# Patient Record
Sex: FEMALE | Race: WHITE | URBAN - METROPOLITAN AREA
[De-identification: names, ages, dates, MRNs, and addresses within clinical notes are randomized per-mention and may not be internally consistent; named-entity substitution may affect disease eponyms.]

---

## 2017-01-03 ENCOUNTER — THERAPY VISIT (OUTPATIENT)
Dept: PHYSICAL THERAPY | Facility: CLINIC | Age: 22
End: 2017-01-03
Payer: COMMERCIAL

## 2017-01-03 DIAGNOSIS — M25.551 HIP PAIN, RIGHT: Primary | ICD-10-CM

## 2017-01-03 PROCEDURE — 97110 THERAPEUTIC EXERCISES: CPT | Mod: GP | Performed by: PHYSICAL THERAPIST

## 2017-01-03 PROCEDURE — 97140 MANUAL THERAPY 1/> REGIONS: CPT | Mod: GP | Performed by: PHYSICAL THERAPIST

## 2017-01-10 ENCOUNTER — THERAPY VISIT (OUTPATIENT)
Dept: PHYSICAL THERAPY | Facility: CLINIC | Age: 22
End: 2017-01-10
Payer: COMMERCIAL

## 2017-01-10 DIAGNOSIS — M25.551 HIP PAIN, RIGHT: Primary | ICD-10-CM

## 2017-01-10 PROCEDURE — 97140 MANUAL THERAPY 1/> REGIONS: CPT | Mod: GP | Performed by: PHYSICAL THERAPIST

## 2017-01-10 PROCEDURE — 97110 THERAPEUTIC EXERCISES: CPT | Mod: GP | Performed by: PHYSICAL THERAPIST

## 2017-01-10 NOTE — PROGRESS NOTES
The risks, perceived benefits and potential complications (including but not limited to: dizziness/drowsiness, bleeding, infection, pain, damage to adjacent structures, failure to relieve symptoms) of dry needling were discussed with the patient. Questions were addressed and answered.  The patient elected to proceed. Verbal informed consent was obtained.

## 2017-01-19 ENCOUNTER — THERAPY VISIT (OUTPATIENT)
Dept: PHYSICAL THERAPY | Facility: CLINIC | Age: 22
End: 2017-01-19
Payer: COMMERCIAL

## 2017-01-19 DIAGNOSIS — M25.551 HIP PAIN, RIGHT: Primary | ICD-10-CM

## 2017-01-19 PROCEDURE — 97140 MANUAL THERAPY 1/> REGIONS: CPT | Mod: GP | Performed by: PHYSICAL THERAPIST

## 2017-01-19 PROCEDURE — 97110 THERAPEUTIC EXERCISES: CPT | Mod: GP | Performed by: PHYSICAL THERAPIST

## 2017-01-19 PROCEDURE — 97112 NEUROMUSCULAR REEDUCATION: CPT | Mod: GP | Performed by: PHYSICAL THERAPIST

## 2017-01-26 ENCOUNTER — THERAPY VISIT (OUTPATIENT)
Dept: PHYSICAL THERAPY | Facility: CLINIC | Age: 22
End: 2017-01-26
Payer: COMMERCIAL

## 2017-01-26 DIAGNOSIS — M25.551 HIP PAIN, RIGHT: Primary | ICD-10-CM

## 2017-01-26 PROCEDURE — 97140 MANUAL THERAPY 1/> REGIONS: CPT | Mod: GP | Performed by: PHYSICAL THERAPIST

## 2017-01-26 PROCEDURE — 97110 THERAPEUTIC EXERCISES: CPT | Mod: GP | Performed by: PHYSICAL THERAPIST

## 2017-01-26 PROCEDURE — 97112 NEUROMUSCULAR REEDUCATION: CPT | Mod: GP | Performed by: PHYSICAL THERAPIST

## 2017-01-31 ENCOUNTER — THERAPY VISIT (OUTPATIENT)
Dept: PHYSICAL THERAPY | Facility: CLINIC | Age: 22
End: 2017-01-31
Payer: COMMERCIAL

## 2017-01-31 DIAGNOSIS — M25.551 HIP PAIN, RIGHT: Primary | ICD-10-CM

## 2017-01-31 PROCEDURE — 97110 THERAPEUTIC EXERCISES: CPT | Mod: GP | Performed by: PHYSICAL THERAPIST

## 2017-01-31 PROCEDURE — 97140 MANUAL THERAPY 1/> REGIONS: CPT | Mod: GP | Performed by: PHYSICAL THERAPIST

## 2017-01-31 PROCEDURE — 97112 NEUROMUSCULAR REEDUCATION: CPT | Mod: GP | Performed by: PHYSICAL THERAPIST

## 2017-02-06 ENCOUNTER — THERAPY VISIT (OUTPATIENT)
Dept: PHYSICAL THERAPY | Facility: CLINIC | Age: 22
End: 2017-02-06
Payer: COMMERCIAL

## 2017-02-06 DIAGNOSIS — M25.551 HIP PAIN, RIGHT: Primary | ICD-10-CM

## 2017-02-06 PROCEDURE — 97110 THERAPEUTIC EXERCISES: CPT | Mod: GP | Performed by: PHYSICAL THERAPIST

## 2017-02-06 PROCEDURE — 97112 NEUROMUSCULAR REEDUCATION: CPT | Mod: GP | Performed by: PHYSICAL THERAPIST

## 2017-02-06 ASSESSMENT — ACTIVITIES OF DAILY LIVING (ADL)
HOS_ADL_ITEM_SCORE_TOTAL: 60
LIGHT_TO_MODERATE_WORK: SLIGHT DIFFICULTY
GOING_DOWN_1_FLIGHT_OF_STAIRS: NO DIFFICULTY AT ALL
PUTTING_ON_SOCKS_AND_SHOES: NO DIFFICULTY AT ALL
WALKING_APPROXIMATELY_10_MINUTES: SLIGHT DIFFICULTY
GETTING_INTO_AND_OUT_OF_A_BATHTUB: NO DIFFICULTY AT ALL
GOING_UP_1_FLIGHT_OF_STAIRS: NO DIFFICULTY AT ALL
HOS_ADL_COUNT: 17
ROLLING_OVER_IN_BED: SLIGHT DIFFICULTY
WALKING_INITIALLY: SLIGHT DIFFICULTY
WALKING_DOWN_STEEP_HILLS: NO DIFFICULTY AT ALL
HOS_ADL_SCORE(%): 88.24
GETTING_INTO_AND_OUT_OF_AN_AVERAGE_CAR: SLIGHT DIFFICULTY
HOS_ADL_HIGHEST_POTENTIAL_SCORE: 68
TWISTING/PIVOTING_ON_INVOLVED_LEG: SLIGHT DIFFICULTY
WALKING_UP_STEEP_HILLS: NO DIFFICULTY AT ALL
STEPPING_UP_AND_DOWN_CURBS: NO DIFFICULTY AT ALL
STANDING_FOR_15_MINUTES: NO DIFFICULTY AT ALL
SITTING_FOR_15_MINUTES: NO DIFFICULTY AT ALL
HEAVY_WORK: NO DIFFICULTY AT ALL
RECREATIONAL_ACTIVITIES: SLIGHT DIFFICULTY
DEEP_SQUATTING: NO DIFFICULTY AT ALL
WALKING_15_MINUTES_OR_GREATER: SLIGHT DIFFICULTY

## 2017-02-13 ENCOUNTER — THERAPY VISIT (OUTPATIENT)
Dept: PHYSICAL THERAPY | Facility: CLINIC | Age: 22
End: 2017-02-13
Payer: COMMERCIAL

## 2017-02-13 DIAGNOSIS — M25.551 HIP PAIN, RIGHT: ICD-10-CM

## 2017-02-13 PROCEDURE — 97110 THERAPEUTIC EXERCISES: CPT | Mod: GP | Performed by: PHYSICAL THERAPIST

## 2017-02-13 PROCEDURE — 97140 MANUAL THERAPY 1/> REGIONS: CPT | Mod: GP | Performed by: PHYSICAL THERAPIST

## 2017-02-13 PROCEDURE — 97112 NEUROMUSCULAR REEDUCATION: CPT | Mod: GP | Performed by: PHYSICAL THERAPIST

## 2017-02-27 ENCOUNTER — THERAPY VISIT (OUTPATIENT)
Dept: PHYSICAL THERAPY | Facility: CLINIC | Age: 22
End: 2017-02-27
Payer: COMMERCIAL

## 2017-02-27 DIAGNOSIS — M25.551 HIP PAIN, RIGHT: ICD-10-CM

## 2017-02-27 PROCEDURE — 97140 MANUAL THERAPY 1/> REGIONS: CPT | Mod: GP | Performed by: PHYSICAL THERAPIST

## 2017-02-27 PROCEDURE — 97110 THERAPEUTIC EXERCISES: CPT | Mod: GP | Performed by: PHYSICAL THERAPIST

## 2017-03-13 ENCOUNTER — THERAPY VISIT (OUTPATIENT)
Dept: PHYSICAL THERAPY | Facility: CLINIC | Age: 22
End: 2017-03-13
Payer: COMMERCIAL

## 2017-03-13 DIAGNOSIS — M25.551 HIP PAIN, RIGHT: ICD-10-CM

## 2017-03-13 PROCEDURE — 97112 NEUROMUSCULAR REEDUCATION: CPT | Mod: GP | Performed by: PHYSICAL THERAPIST

## 2017-03-13 PROCEDURE — 97110 THERAPEUTIC EXERCISES: CPT | Mod: GP | Performed by: PHYSICAL THERAPIST

## 2017-03-28 ENCOUNTER — OFFICE VISIT (OUTPATIENT)
Dept: INTERNAL MEDICINE | Facility: CLINIC | Age: 22
End: 2017-03-28

## 2017-03-28 VITALS
WEIGHT: 140.8 LBS | DIASTOLIC BLOOD PRESSURE: 66 MMHG | BODY MASS INDEX: 24.94 KG/M2 | HEART RATE: 90 BPM | SYSTOLIC BLOOD PRESSURE: 103 MMHG

## 2017-03-28 DIAGNOSIS — D22.9 MULTIPLE BENIGN NEVI: ICD-10-CM

## 2017-03-28 DIAGNOSIS — L70.0 ACNE VULGARIS: ICD-10-CM

## 2017-03-28 DIAGNOSIS — L24.5 IRRITANT CONTACT DERMATITIS DUE TO OTHER CHEMICAL PRODUCTS: Primary | ICD-10-CM

## 2017-03-28 RX ORDER — TRIAMCINOLONE ACETONIDE 1 MG/G
CREAM TOPICAL
Qty: 30 G | Refills: 0 | Status: SHIPPED | OUTPATIENT
Start: 2017-03-28

## 2017-03-28 RX ORDER — CLINDAMYCIN AND BENZOYL PEROXIDE 10; 50 MG/G; MG/G
GEL TOPICAL 2 TIMES DAILY
Qty: 30 G | Refills: 1 | Status: SHIPPED | OUTPATIENT
Start: 2017-03-28

## 2017-03-28 ASSESSMENT — ENCOUNTER SYMPTOMS
STIFFNESS: 0
SLEEP DISTURBANCES DUE TO BREATHING: 0
NAIL CHANGES: 0
SORE THROAT: 0
CLAUDICATION: 0
POOR WOUND HEALING: 1
ORTHOPNEA: 0
JOINT SWELLING: 0
HOARSE VOICE: 0
NECK PAIN: 0
LEG SWELLING: 0
TACHYCARDIA: 1
TROUBLE SWALLOWING: 0
LEG PAIN: 0
HYPERTENSION: 0
SINUS CONGESTION: 0
NECK MASS: 0
SYNCOPE: 0
ARTHRALGIAS: 1
SKIN CHANGES: 1
MUSCLE WEAKNESS: 0
PALPITATIONS: 1
LIGHT-HEADEDNESS: 1
MYALGIAS: 0
EXERCISE INTOLERANCE: 0
SINUS PAIN: 0
HYPOTENSION: 0
SMELL DISTURBANCE: 0
TASTE DISTURBANCE: 0
BACK PAIN: 0
MUSCLE CRAMPS: 0

## 2017-03-28 ASSESSMENT — ACTIVITIES OF DAILY LIVING (ADL)
ARE_THERE_CARBON_MONOXIDE_DETECTORS_IN_YOUR_HOME?: N
DO_MEMBERS_OF_YOUR_HOUSEHOLD_WEAR_SEAT_BELTS?: Y
DO_MEMBERS_OF_YOUR_HOUSEHOLD_USE_SUNSCREEN?: Y
ARE_THERE_FIREARMS_IN_YOUR_HOME?: Y
DO_MEMBERS_OF_YOUR_HOUSEHOLD_USE_SAFETY_HELMETS?: Y
ARE_THERE_SMOKE_DETECTORS_IN_YOUR_HOME?: Y

## 2017-03-28 ASSESSMENT — PAIN SCALES - GENERAL: PAINLEVEL: NO PAIN (0)

## 2017-03-28 NOTE — PATIENT INSTRUCTIONS
[facility] Medication Refill Request Information:  * Please contact your pharmacy regarding ANY request for medication refills.  ** [facility] Prescription Fax = [phone]  * Please allow 3 business days for routine medication refills.  * Please allow 5 business days for controlled substance medication refills.     [facility] Test Result notification information:  *You will be notified with in 7-10 days of your appointment day regarding the results of your test.  If you are on MyChart you will be notified as soon as the provider has reviewed the results and signed off on them.      Recognizing Skin Cancer  Doing monthly skin checkups is the best way to find new marks or skin changes. During your skin checkups, be sure to follow the ABCDEs of skin checks. This means checking moles or other growths for Asymmetry, Border, Color, Diameter, and Evolving (changing). Note, too, any new growths, or, if any of your growths bleed, itch, or are painful.  The ABCDEs of skin checks  Check your moles or growths for signs of melanoma using ABCDE:    Asymmetry: the sides of the mole or growth don t match    Border: the edges are ragged, notched, or blurred    Color: the color within the mole or growth varies    Diameter: the mole or growth is larger than 6 mm (size of a pencil eraser)    Evolving: the size, shape, or color of the mole or growth is changing (evolving is not shown below.)       In addition to the ABCDEs, other warning signs of skin cancer include:    A spot or mole that looks different from all other marks on your skin    Changes in how an area feels, such as itching, tenderness, or pain    Changes in the skin's surface, such as oozing, bleeding, or scaliness    A sore that does not heal    New swelling or redness beyond the border of a mole  Who s at risk?  Anyone can get skin cancer. But you are at greater risk if you have:    Fair skin, light-colored hair, or light-colored eyes    Many moles or  abnormal moles on your skin    A history of sunburns from sunlight or tanning beds    A family history of skin cancer    A history of exposure to radiation or chemicals    A weakened immune system  Also, a personal history of skin cancer puts you at risk for recurring skin cancer.  How to check your skin  Do your monthly skin checkups in front of a full-length mirror. Check all parts of your body, including your:    Head (ears, face, neck, and scalp)    Torso (front, back, and sides)    Arms (tops, undersides, upper, and lower armpits)    Hands (palms, backs, and fingers, including under the nails)    Buttocks and genitals    Legs (front, back, and sides)    Feet (tops, soles, toes, including under the nails, and between toes)  If you have a lot of moles, take digital photos of them each month. Make sure to take photos both up close and from a distance. These can help you see if any moles change over time.  When to seek medical treatment  Most skin changes are not cancer. But if you see any changes in your skin, call your doctor right away. Only he or she can diagnose a problem. If you have skin cancer, seeing your doctor can be the first step toward getting the treatment that could save your life.     [id number] [employer]. [address] [place] [address], [place] [address]. All rights reserved. This information is not intended as a substitute for professional medical care. Always follow your healthcare professional's instructions.

## 2017-03-28 NOTE — NURSING NOTE
Chief Complaint   Patient presents with     Derm Problem     Patient here for dryness on hands for 1-2 months.      Peter Dietz CMA at 11:26 AM on 3/28/2017.

## 2017-03-28 NOTE — PROGRESS NOTES
"Marla Conley is a 21 year old female who comes in for    CC: skin problem on finger tips  HPI:  Irritation on the fingers, started out with just 1 finger, now on all of them, spreading onto palms. Present x1 month  Dry, starting to be painful with pressure and hot water. Has been using Aveeno lotion to treat, Dove soap. Using Dial soap to wash body. Softsoap for hand washing--no reaction to any of these products in the past. Uses a \"cheap laundry detergent\" >1 year.   Started swimming recently, but skin issue was present before then. Works at [email or web address], around the fish at times. There is a de- in the water. No known chemical exposure. Had fish as a child and gone swimming in the past, without reaction. No fevers. No new body aches/chills.   New moles--she thinks, maybe new freckles. Hasn't always been the best with sunscreen. No known FMH of skin cancer.  Hx eczema as a child.     Other issues discussed today:     Patient Active Problem List   Diagnosis     Acne     Hip pain, right       Current Outpatient Prescriptions   Medication Sig Dispense Refill     L-Lysine 1000 MG TABS Take 1,000 mg by mouth daily       triamcinolone (KENALOG) 0.1 % cream Apply sparingly to affected area three times daily for 14 days. 30 g 0     clindamycin-benzoyl peroxide (BENZACLIN) gel Apply topically 2 times daily 30 g 1     ibuprofen (ADVIL,MOTRIN) 200 MG tablet Take 200 mg by mouth every 4 hours as needed.           ALLERGIES: Review of patient's allergies indicates no known allergies.    PAST MEDICAL HX:   Past Medical History:   Diagnosis Date     Acne vulgaris      Eczema     As a child       PAST SURGICAL HX: No past surgical history on file.    IMMUNIZATION HX:   Immunization History   Administered Date(s) Administered     DTAP (<7y) 1997, 1997, 02/23/1998, 11/19/1998, 07/23/2002     HIB 1997, 1997, 02/23/1998, 11/19/1998     Hepatitis B 1995, 1995, 1997, 05/18/1998     " Human Papilloma Virus 08/28/2009, 08/02/2013, 01/21/2015     IPV 1995, 1997, 11/19/1998, 07/23/2002     Influenza (IIV3) 11/26/2013     MMR 08/26/1998, 09/22/2001     Mantoux 12/28/2015     Meningococcal (Menactra ) 01/21/2015     TDAP Vaccine (Boostrix) 09/01/2011     Tdap (Adacel,Boostrix) 08/28/2009     Varicella 08/26/1998, 02/19/2007       SOCIAL HX:   Social History     Social History Narrative    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[employer], studying Biology, hoping to go to [employer]     Answers for HPI/ROS submitted by the patient on 3/28/2017   General Symptoms: No  Skin Symptoms: Yes  HENT Symptoms: Yes  EYE SYMPTOMS: No  HEART SYMPTOMS: Yes  LUNG SYMPTOMS: No  INTESTINAL SYMPTOMS: No  URINARY SYMPTOMS: No  GYNECOLOGIC SYMPTOMS: No  BREAST SYMPTOMS: No  SKELETAL SYMPTOMS: Yes  BLOOD SYMPTOMS: No  NERVOUS SYSTEM SYMPTOMS: No  MENTAL HEALTH SYMPTOMS: No  PEDS Symptoms: No  Changes in hair: No  Changes in moles/birth marks: Yes  Itching: Yes  Rashes: Yes  Changes in nails: No  Acne: No  Hair in places you don't want it: No  Change in facial hair: No  Warts: No  Non-healing sores: Yes  Scarring: No  Flaking of skin: No  Color changes of hands/feet in cold : No  Sun sensitivity: No  Skin thickening: No  Ear pain: Yes  Ear discharge: No  Hearing loss: No  Tinnitus: No  Nosebleeds: No  Congestion: No  Sinus pain: No  Trouble swallowing: No   Voice hoarseness: No  Mouth sores: No  Sore throat: No  Tooth pain: Yes  Gum tenderness: No  Bleeding gums: No  Change in taste: No  Change in sense of smell: No  Dry mouth: No  Hearing aid used: No  Neck lump: No  Chest pain or pressure: No  Fast or irregular heartbeat: Yes--attributes to caffeine, stress, insufficient sleep  Pain in legs with walking: No  Swelling in feet or ankles: No  Trouble breathing while lying down: No  Fingers or Toes appear blue: No  High blood pressure: No  Low blood pressure: No  Fainting: No  Murmurs: No  Chest pain on exertion: No  Chest  pain at rest: No  Cramping pain in leg during exercise: No  Pacemaker: No  Varicose veins: No  Edema or swelling: No  Fast heart beat: Yes  Wake up at night with shortness of breath: No  Heart flutters: Yes  Light-headedness: Yes  Exercise intolerance: No  Back pain: No  Muscle aches: No  Neck pain: No  Swollen joints: No  Joint pain: Yes  Bone pain: No  Muscle cramps: No  Muscle weakness: No  Joint stiffness: No  Bone fracture: No    OBJECTIVE:  /66 (BP Location: Left arm, Patient Position: Chair, Cuff Size: Adult Regular)  Pulse 90  Wt 63.9 kg (140 lb 12.8 oz)  Breastfeeding? No  BMI 24.94 kg/m2   Wt Readings from Last 1 Encounters:   03/28/17 63.9 kg (140 lb 12.8 oz) (71 %)*     * Growth percentiles are based on [facility] 2-20 Years data.     Constitutional: no distress, comfortable, pleasant, well-groomed  Cardiovascular: regular rate and rhythm, normal S1 and S2, no murmurs, rubs or gallops, radial pulses full and symmetric, cap refill <2 sec  Respiratory: clear to auscultation with good air movement bilaterally, no wheezes or crackles, non-labored  Skin: multiple scattered closed comedones on face, few freckles without any concerning nevi, no jaundice, temp normal, skin n fingertips is erythematous, dry, and peeling  Psychological: appropriate mood, demonstrates intact judgment and logical thought process      ASSESSMENT/PLAN:    1. 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 Recommended Kenalog cream TID for 14 days, regular moisturizer (non-scented, hypoallergenic), and wearing Nitrile gloves while at work.  - triamcinolone (KENALOG) 0.1 % cream; Apply sparingly to affected area three times daily for 14 days.  Dispense: 30 g; Refill: 0    2. Acne vulgaris  3. Multiple benign nevi  Refill provided for Benzaclin gel. Reviewed ABCDE's of skin cancer; no concerning nevi today. Discussed pt can  schedule with dermatology for full skin scan, and f/u acne if needed.  - DERMATOLOGY REFERRAL  - clindamycin-benzoyl peroxide (BENZACLIN) gel; Apply topically 2 times daily  Dispense: 30 g; Refill: 1    FOLLOW UP: If not improving or if worsening    ANABELLA Cruz CNP

## 2017-10-12 ENCOUNTER — OFFICE VISIT (OUTPATIENT)
Dept: DERMATOLOGY | Facility: CLINIC | Age: 22
End: 2017-10-12

## 2017-10-12 DIAGNOSIS — D22.9 MULTIPLE BENIGN NEVI: Primary | ICD-10-CM

## 2017-10-12 DIAGNOSIS — L70.0 ACNE VULGARIS: ICD-10-CM

## 2017-10-12 ASSESSMENT — PAIN SCALES - GENERAL: PAINLEVEL: NO PAIN (0)

## 2017-10-12 NOTE — MR AVS SNAPSHOT
After Visit Summary   10/12/2017    Marla Conley    MRN: [id number]           Patient Information     Date Of Birth          1995        Visit Information        Provider Department      10/12/2017 4:15 PM GARRETT Poe MD [facility] Dermatology        Today's Diagnoses     Multiple benign nevi    -  1    Acne vulgaris          Care Instructions    The ABCDEs of Melanoma    Skin cancer can develop anywhere on the skin. Ask someone for help when checking your skin, especially in hard to see places. If you notice a mole different from others, or that changes, enlarges, itches, or bleeds (even if it is small), you should see a dermatologist.        Start over-the-counter benzoyl peroxide 10% wash on the shoulders, upper back and face.  If 10% is too irritating you can use the 5%. ([employer]Clear makes this product. It is available here at the pharmacy or at target). This medication can bleach your towels and clothing.     It is found in a purple tube in the acne aisle.                     Follow-ups after your visit        Follow-up notes from your care team     Return if symptoms worsen or fail to improve.      Who to contact     Please call your clinic at [phone] to:    Ask questions about your health    Make or cancel appointments    Discuss your medicines    Learn about your test results    Speak to your doctor   If you have compliments or concerns about an experience at your clinic, or if you wish to file a complaint, please contact [facility] Patient Relations at [phone] or email us at [email or web address]         Additional Information About Your Visit        MyChart Information     [employer] gives you secure access to your electronic health record. If you see a primary care provider, you can also send messages to your care team and make appointments. If you have questions, please call your primary care clinic.  If you do not have a primary  care provider, please call [phone] and they will assist you.      [employer] is an electronic gateway that provides easy, online access to your medical records. With [employer], you can request a clinic appointment, read your test results, renew a prescription or communicate with your care team.     To access your existing account, please contact your [facility] or call [phone] for assistance.        Care EveryWhere ID     This is your Care EveryWhere ID. This could be used by other organizations to access your [place] medical records  [id number]         Blood Pressure from Last 3 Encounters:   03/28/17 103/66   01/21/15 114/66   11/26/13 119/75    Weight from Last 3 Encounters:   03/28/17 63.9 kg (140 lb 12.8 oz) (71 %)*   12/08/16 63.5 kg (140 lb) (71 %)*   07/27/16 63.5 kg (140 lb) (72 %)*     * Growth percentiles are based on [facility] 2-20 Years data.              Today, you had the following     No orders found for display       Primary Care Provider    Ray Amezquita MD       [place] [id number]        Equal Access to Services     [facility]: Hadii aad ku hadasho Soomaali, waaxda luqadaha, qaybta kaalmada ademariiayada, amando whittaker . So [facility] [phone].    ATENCIÓN: Si habla español, tiene a jordan disposición servicios gratuitos de asistencia lingüística. Llame al [phone].    We comply with applicable federal civil rights laws and [place] laws. We do not discriminate on the basis of race, color, national origin, age, disability, sex, sexual orientation, or gender identity.            Thank you!     Thank you for choosing [facility]  for your care. Our goal is always to provide you with excellent care. Hearing back from our patients is one way we can continue to improve our services. Please take a few minutes to complete the written survey that you may receive in the mail after your visit with us. Thank you!             Your Updated  Medication List - Protect others around you: Learn how to safely use, store and throw away your medicines at www.disposemymeds.org.          This list is accurate as of: 10/12/17  4:27 PM.  Always use your most recent med list.                   Brand Name Dispense Instructions for use Diagnosis    clindamycin-benzoyl peroxide gel    BENZACLIN    30 g    Apply topically 2 times daily    Acne vulgaris       ibuprofen 200 MG tablet    ADVIL/MOTRIN     Take 200 mg by mouth every 4 hours as needed.        L-Lysine 1000 MG Tabs      Take 1,000 mg by mouth daily        triamcinolone 0.1 % cream    KENALOG    30 g    Apply sparingly to affected area three times daily for 14 days.    Irritant contact dermatitis due to other chemical products

## 2017-10-12 NOTE — NURSING NOTE
"Dermatology Rooming Note    Marla Conley's goals for this visit include:   Chief Complaint   Patient presents with     Skin Check     Marla is here today for a skin check. Marla notes\" I dont have many concerns\"     Tamra Johnson, RMDELONTE    "

## 2017-10-12 NOTE — LETTER
"10/12/2017       RE: Marla Conley  [address] [address]     Dear Colleague,    Thank you for referring your patient, Marla Conley, to the [facility] DERMATOLOGY at [facility]. Please see a copy of my visit note below.    [facility] Dermatology Note      Dermatology Problem List:  1.Benign melanocytic nevi  2. Acne vulgaris    Encounter Date: Oct 12, 2017    CC:   Chief Complaint   Patient presents with     Skin Check     Marla is here today for a skin check. Marla notes\" I dont have many concerns\"         History of Present Illness:  Ms. Marla Conley is a 22 year old female who presents for evaluation of a mole on her left shoulder that has gotten a little bigger. She also has a freckle on her right hand that has gotten somewhat larger. She denies any pain, bleeding or itching associated with these spots. No significant changes recently. No other lesions of concern.    For her acne she uses a [employer] acne cleanser which seems to work well for her face. She is not interested in prescription treatment at this time.     Past Medical History:   Patient Active Problem List   Diagnosis     Acne     Hip pain, right     Past Medical History:   Diagnosis Date     Acne vulgaris      Eczema     As a child     History reviewed. No pertinent surgical history.    Social History:  The patient is a student at [employer]. The patient denies use of tanning beds.    Family History:  There is no family history of melanoma.    Medications:  Current Outpatient Prescriptions   Medication Sig Dispense Refill     L-Lysine 1000 MG TABS Take 1,000 mg by mouth daily       triamcinolone (KENALOG) 0.1 % cream Apply sparingly to affected area three times daily for 14 days. 30 g 0     clindamycin-benzoyl peroxide (BENZACLIN) gel Apply topically 2 times daily 30 g 1     ibuprofen (ADVIL,MOTRIN) 200 MG tablet Take 200 mg by mouth " every 4 hours as needed.          No Known Allergies      Review of Systems:  -As per HPI  -Constitutional: The patient denies fatigue, fevers, chills, unintended weight loss, and night sweats.  -HEENT: Patient denies nonhealing oral sores.  -Skin: As above in HPI. No additional skin concerns.    Physical exam:  Vitals: There were no vitals taken for this visit.  GEN: This is a well developed, well-nourished female in no acute distress, in a pleasant mood.    SKIN: Full skin, which includes the head/face, both arms, chest, back, abdomen,both legs, genitalia and/or groin buttocks, digits and/or nails, was examined.  -There are superifical acneiform papules with intermixed open and closed comedones on the shoulders, upper back. Bilateral cheeks and forehead with few inflammatory papules, scattered depressed scars over the cheeks.   Multiple regular brown pigmented macules and papules are identified on the left upper shoulder, right dorsal hand as well as the bilateral upper arms, left neck, chest, back  - well defined light brown patches on the abdomen and central back c/w cafe au lait macules.   -No other lesions of concern on areas examined.     Impression/Plan:  1. Acne vulgaris    Discussed etiology and various treatments. Patient prefers to continue Deepthi acne products    Recommended benzoyl peroxide face wash for shoulders and back    2. Multiple clinically benign nevi. No concerning lesions on exam today.     ABCDs of melanoma were discussed and self skin checks were advised. , Sun precaution was advised including the use of sun screens of SPF 30 or higher, sun protective clothing, and avoidance of tanning beds.    3. Cafe au lait macules. Discussed benign nature    Follow-up prn for new or changing lesions.       Dr.Spencer Poe staffed the patient.    Staff Involved:  Resident(Magaly Yeboah)/Staff(as above)    Again, thank you for allowing me to participate in the care of your patient.       Sincerely,    GARRETT Poe MD

## 2017-10-12 NOTE — PATIENT INSTRUCTIONS
The ABCDEs of Melanoma    Skin cancer can develop anywhere on the skin. Ask someone for help when checking your skin, especially in hard to see places. If you notice a mole different from others, or that changes, enlarges, itches, or bleeds (even if it is small), you should see a dermatologist.        Start over-the-counter benzoyl peroxide 10% wash on the shoulders, upper back and face.  If 10% is too irritating you can use the 5%. (Clean[employer]Clear makes this product. It is available here at the pharmacy or at [employer]). This medication can bleach your towels and clothing.     It is found in a purple tube in the acne aisle.

## 2017-10-12 NOTE — PROGRESS NOTES
"[facility] Dermatology Note      Dermatology Problem List:  1.Benign melanocytic nevi  2. Acne vulgaris    Encounter Date: Oct 12, 2017    CC:   Chief Complaint   Patient presents with     Skin Check     Marla is here today for a skin check. Marla notes\" I dont have many concerns\"         History of Present Illness:  Ms. Marla Conley is a 22 year old female who presents for evaluation of a mole on her left shoulder that has gotten a little bigger. She also has a freckle on her right hand that has gotten somewhat larger. She denies any pain, bleeding or itching associated with these spots. No significant changes recently. No other lesions of concern.    For her acne she uses a [employer] acne cleanser which seems to work well for her face. She is not interested in prescription treatment at this time.     Past Medical History:   Patient Active Problem List   Diagnosis     Acne     Hip pain, right     Past Medical History:   Diagnosis Date     Acne vulgaris      Eczema     As a child     History reviewed. No pertinent surgical history.    Social History:  The patient is a student at [employer]. The patient denies use of tanning beds.    Family History:  There is no family history of melanoma.    Medications:  Current Outpatient Prescriptions   Medication Sig Dispense Refill     L-Lysine 1000 MG TABS Take 1,000 mg by mouth daily       triamcinolone (KENALOG) 0.1 % cream Apply sparingly to affected area three times daily for 14 days. 30 g 0     clindamycin-benzoyl peroxide (BENZACLIN) gel Apply topically 2 times daily 30 g 1     ibuprofen (ADVIL,MOTRIN) 200 MG tablet Take 200 mg by mouth every 4 hours as needed.          No Known Allergies      Review of Systems:  -As per HPI  -Constitutional: The patient denies fatigue, fevers, chills, unintended weight loss, and night sweats.  -HEENT: Patient denies nonhealing oral sores.  -Skin: As above in HPI. No additional skin " concerns.    Physical exam:  Vitals: There were no vitals taken for this visit.  GEN: This is a well developed, well-nourished female in no acute distress, in a pleasant mood.    SKIN: Full skin, which includes the head/face, both arms, chest, back, abdomen,both legs, genitalia and/or groin buttocks, digits and/or nails, was examined.  -There are superifical acneiform papules with intermixed open and closed comedones on the shoulders, upper back. Bilateral cheeks and forehead with few inflammatory papules, scattered depressed scars over the cheeks.   Multiple regular brown pigmented macules and papules are identified on the left upper shoulder, right dorsal hand as well as the bilateral upper arms, left neck, chest, back  - well defined light brown patches on the abdomen and central back c/w cafe au lait macules.   -No other lesions of concern on areas examined.     Impression/Plan:  1. Acne vulgaris    Discussed etiology and various treatments. Patient prefers to continue Deepthi acne products    Recommended benzoyl peroxide face wash for shoulders and back    2. Multiple clinically benign nevi. No concerning lesions on exam today.     ABCDs of melanoma were discussed and self skin checks were advised. , Sun precaution was advised including the use of sun screens of SPF 30 or higher, sun protective clothing, and avoidance of tanning beds.    3. Cafe au lait macules. Discussed benign nature    Follow-up prn for new or changing lesions.       Dr.Spencer Poe staffed the patient.    Staff Involved:  Resident(Magaly Yeboah)/Staff(as above)    I talked with and examined Marla Conley and I agree with the assessment and the plan. NISSA Poe MD.

## 2018-10-27 ENCOUNTER — HEALTH MAINTENANCE LETTER (OUTPATIENT)
Age: 23
End: 2018-10-27

## 2019-02-26 ENCOUNTER — TRANSFERRED RECORDS (OUTPATIENT)
Dept: HEALTH INFORMATION MANAGEMENT | Facility: CLINIC | Age: 24
End: 2019-02-26

## 2019-10-02 ENCOUNTER — HEALTH MAINTENANCE LETTER (OUTPATIENT)
Age: 24
End: 2019-10-02

## 2020-08-03 NOTE — MR AVS SNAPSHOT
After Visit Summary   3/28/2017    Marla Conley    MRN: [id number]           Patient Information     Date Of Birth          1995        Visit Information        Provider Department      3/28/2017 11:30 AM Pam Fairchild APRN [facility]        Today's Diagnoses     Irritant contact dermatitis due to other chemical products    -  1    Acne vulgaris        Multiple benign nevi          Care Instructions    Primary Care Center Medication Refill Request Information:  * Please contact your pharmacy regarding ANY request for medication refills.  ** [facility] Prescription Fax = [phone]  * Please allow 3 business days for routine medication refills.  * Please allow 5 business days for controlled substance medication refills.     Primary Care Center Test Result notification information:  *You will be notified with in 7-10 days of your appointment day regarding the results of your test.  If you are on MyChart you will be notified as soon as the provider has reviewed the results and signed off on them.      Recognizing Skin Cancer  Doing monthly skin checkups is the best way to find new marks or skin changes. During your skin checkups, be sure to follow the ABCDEs of skin checks. This means checking moles or other growths for Asymmetry, Border, Color, Diameter, and Evolving (changing). Note, too, any new growths, or, if any of your growths bleed, itch, or are painful.  The ABCDEs of skin checks  Check your moles or growths for signs of melanoma using ABCDE:    Asymmetry: the sides of the mole or growth don t match    Border: the edges are ragged, notched, or blurred    Color: the color within the mole or growth varies    Diameter: the mole or growth is larger than 6 mm (size of a pencil eraser)    Evolving: the size, shape, or color of the mole or growth is changing (evolving is not shown below.)       In addition to the ABCDEs, other warning signs of skin cancer  include:    A spot or mole that looks different from all other marks on your skin    Changes in how an area feels, such as itching, tenderness, or pain    Changes in the skin's surface, such as oozing, bleeding, or scaliness    A sore that does not heal    New swelling or redness beyond the border of a mole  Who s at risk?  Anyone can get skin cancer. But you are at greater risk if you have:    Fair skin, light-colored hair, or light-colored eyes    Many moles or abnormal moles on your skin    A history of sunburns from sunlight or tanning beds    A family history of skin cancer    A history of exposure to radiation or chemicals    A weakened immune system  Also, a personal history of skin cancer puts you at risk for recurring skin cancer.  How to check your skin  Do your monthly skin checkups in front of a full-length mirror. Check all parts of your body, including your:    Head (ears, face, neck, and scalp)    Torso (front, back, and sides)    Arms (tops, undersides, upper, and lower armpits)    Hands (palms, backs, and fingers, including under the nails)    Buttocks and genitals    Legs (front, back, and sides)    Feet (tops, soles, toes, including under the nails, and between toes)  If you have a lot of moles, take digital photos of them each month. Make sure to take photos both up close and from a distance. These can help you see if any moles change over time.  When to seek medical treatment  Most skin changes are not cancer. But if you see any changes in your skin, call your doctor right away. Only he or she can diagnose a problem. If you have skin cancer, seeing your doctor can be the first step toward getting the treatment that could save your life.     7697-0044 [employer]. [address] [place] [address], [place] [address]. All rights reserved. This information is not intended as a substitute for professional medical care. Always follow your healthcare professional's instructions.             Follow-ups after your visit        Additional Services     DERMATOLOGY REFERRAL       Your provider has referred you to: [facility]: Dermatology Clinic [facility] [phone]   [email or web address]    Please be aware that coverage of these services is subject to the terms and limitations of your health insurance plan.  Call member services at your health plan with any benefit or coverage questions.      Please bring the following with you to your appointment:    (1) Any X-Rays, CTs or MRIs which have been performed.  Contact the facility where they were done to arrange for  prior to your scheduled appointment.    (2) List of current medications  (3) This referral request   (4) Any documents/labs given to you for this referral                  Who to contact     Please call your clinic at [phone] to:    Ask questions about your health    Make or cancel appointments    Discuss your medicines    Learn about your test results    Speak to your doctor   If you have compliments or concerns about an experience at your clinic, or if you wish to file a complaint, please contact [facility] Patient Relations at [phone] or email us at [email or web address]         Additional Information About Your Visit        [employer] Information     [employer] gives you secure access to your electronic health record. If you see a primary care provider, you can also send messages to your care team and make appointments. If you have questions, please call your primary care clinic.  If you do not have a primary care provider, please call [phone] and they will assist you.      [employer] is an electronic gateway that provides easy, online access to your medical records. With [employer], you can request a clinic appointment, read your test results, renew a prescription or communicate with your care team.     To access your existing account, please contact your  [facility] or call [phone] for assistance.        Care EveryWhere ID     This is your Care EveryWhere ID. This could be used by other organizations to access your [facility] medical records  [id number]        Your Vitals Were     Pulse Breastfeeding? BMI (Body Mass Index)             90 No 24.94 kg/m2          Blood Pressure from Last 3 Encounters:   03/28/17 103/66   01/21/15 114/66   11/26/13 119/75    Weight from Last 3 Encounters:   03/28/17 63.9 kg (140 lb 12.8 oz) (71 %)*   12/08/16 63.5 kg (140 lb) (71 %)*   07/27/16 63.5 kg (140 lb) (72 %)*     * Growth percentiles are based on [facility] 2-20 Years data.              We Performed the Following     DERMATOLOGY REFERRAL          Today's Medication Changes          These changes are accurate as of: 3/28/17 11:48 AM.  If you have any questions, ask your nurse or doctor.               Start taking these medicines.        Dose/Directions    clindamycin-benzoyl peroxide gel   Commonly known as:  BENZACLIN   Used for:  Acne vulgaris   Started by:  Pam Fairchild APRN CNP        Apply topically 2 times daily   Quantity:  30 g   Refills:  1       triamcinolone 0.1 % cream   Commonly known as:  KENALOG   Used for:  Irritant contact dermatitis due to other chemical products   Started by:  Pam Fairchild APRN CNP        Apply sparingly to affected area three times daily for 14 days.   Quantity:  30 g   Refills:  0            Where to get your medicines      These medications were sent to [id number] - [place] - [address]  AT [facility] [address] [place] [id number] [facility] [place] [address]     Phone:  [phone]     clindamycin-benzoyl peroxide gel    triamcinolone 0.1 % cream                Primary Care Provider Office Phone # Fax #    Ray Amezquita [phone]        [facility] [address]  [facility] [address]        Thank you!     Thank you for choosing [facility]  for your care. Our goal is always to provide you with excellent care. Hearing back from our patients is one way we can continue to improve our services. Please take a few minutes to complete the written survey that you may receive in the mail after your visit with us. Thank you!             Your Updated Medication List - Protect others around you: Learn how to safely use, store and throw away your medicines at www.disposemymeds.org.          This list is accurate as of: 3/28/17 11:48 AM.  Always use your most recent med list.                   Brand Name Dispense Instructions for use    clindamycin-benzoyl peroxide gel    BENZACLIN    30 g    Apply topically 2 times daily       ibuprofen 200 MG tablet    ADVIL/MOTRIN     Take 200 mg by mouth every 4 hours as needed.       L-Lysine 1000 MG Tabs      Take 1,000 mg by mouth daily       triamcinolone 0.1 % cream    KENALOG    30 g    Apply sparingly to affected area three times daily for 14 days.          I have personally performed a face to face diagnostic evaluation on this patient. I have reviewed the ACP note and agree with the history, exam and plan of care, except as noted.

## 2021-01-15 ENCOUNTER — HEALTH MAINTENANCE LETTER (OUTPATIENT)
Age: 26
End: 2021-01-15

## 2021-09-04 ENCOUNTER — HEALTH MAINTENANCE LETTER (OUTPATIENT)
Age: 26
End: 2021-09-04

## 2022-02-19 ENCOUNTER — HEALTH MAINTENANCE LETTER (OUTPATIENT)
Age: 27
End: 2022-02-19

## 2022-10-22 ENCOUNTER — HEALTH MAINTENANCE LETTER (OUTPATIENT)
Age: 27
End: 2022-10-22

## 2023-04-01 ENCOUNTER — HEALTH MAINTENANCE LETTER (OUTPATIENT)
Age: 28
End: 2023-04-01